# Patient Record
Sex: FEMALE | Race: WHITE | URBAN - METROPOLITAN AREA
[De-identification: names, ages, dates, MRNs, and addresses within clinical notes are randomized per-mention and may not be internally consistent; named-entity substitution may affect disease eponyms.]

---

## 2017-10-10 ENCOUNTER — OFFICE VISIT (OUTPATIENT)
Dept: ENDOCRINOLOGY | Age: 52
End: 2017-10-10

## 2017-10-10 VITALS
TEMPERATURE: 97.3 F | DIASTOLIC BLOOD PRESSURE: 73 MMHG | HEART RATE: 68 BPM | OXYGEN SATURATION: 98 % | HEIGHT: 68 IN | SYSTOLIC BLOOD PRESSURE: 104 MMHG | BODY MASS INDEX: 29.49 KG/M2 | WEIGHT: 194.6 LBS | RESPIRATION RATE: 14 BRPM

## 2017-10-10 DIAGNOSIS — E03.9 HYPOTHYROIDISM, UNSPECIFIED TYPE: Primary | ICD-10-CM

## 2017-10-10 DIAGNOSIS — C73 THYROID CANCER (HCC): ICD-10-CM

## 2017-10-10 DIAGNOSIS — E89.0 POSTOPERATIVE HYPOTHYROIDISM: Primary | ICD-10-CM

## 2017-10-10 RX ORDER — CELECOXIB 200 MG/1
200 CAPSULE ORAL DAILY
COMMUNITY
Start: 2017-08-16

## 2017-10-10 RX ORDER — EPINEPHRINE 0.3 MG/.3ML
0.3 INJECTION INTRAMUSCULAR
COMMUNITY
Start: 2017-09-29

## 2017-10-10 RX ORDER — LEVOTHYROXINE SODIUM 25 UG/1
TABLET ORAL
Refills: 0 | COMMUNITY
Start: 2017-08-17 | End: 2017-10-10 | Stop reason: SDUPTHER

## 2017-10-10 RX ORDER — MINERAL OIL
180 ENEMA (ML) RECTAL DAILY
COMMUNITY
Start: 2017-08-16

## 2017-10-10 RX ORDER — GLUCOSAMINE/CHONDR SU A SOD 750-600 MG
2500 TABLET ORAL DAILY
COMMUNITY

## 2017-10-10 RX ORDER — MONTELUKAST SODIUM 10 MG/1
10 TABLET ORAL DAILY
COMMUNITY
Start: 2017-08-16

## 2017-10-10 RX ORDER — MELOXICAM 7.5 MG/1
7.5 TABLET ORAL DAILY
COMMUNITY
Start: 2017-09-12

## 2017-10-10 RX ORDER — TRAZODONE HYDROCHLORIDE 50 MG/1
50 TABLET ORAL
COMMUNITY
Start: 2017-08-16

## 2017-10-10 RX ORDER — CHOLECALCIFEROL (VITAMIN D3) 125 MCG
5000 CAPSULE ORAL DAILY
COMMUNITY
Start: 2017-08-24

## 2017-10-10 RX ORDER — FLUOXETINE HYDROCHLORIDE 20 MG/1
20 CAPSULE ORAL DAILY
COMMUNITY
Start: 2017-08-16

## 2017-10-10 RX ORDER — FLUTICASONE PROPIONATE 50 MCG
2 SPRAY, SUSPENSION (ML) NASAL AS NEEDED
COMMUNITY
Start: 2017-09-29

## 2017-10-10 RX ORDER — BISMUTH SUBSALICYLATE 262 MG
1 TABLET,CHEWABLE ORAL DAILY
COMMUNITY

## 2017-10-10 RX ORDER — LEVOTHYROXINE SODIUM 175 UG/1
175 TABLET ORAL
Refills: 0 | COMMUNITY
Start: 2017-08-18 | End: 2017-10-10 | Stop reason: SDUPTHER

## 2017-10-10 RX ORDER — PANTOPRAZOLE SODIUM 40 MG/1
40 TABLET, DELAYED RELEASE ORAL DAILY
COMMUNITY
Start: 2017-08-16

## 2017-10-10 RX ORDER — FERROUS SULFATE TAB 325 MG (65 MG ELEMENTAL FE) 325 (65 FE) MG
325 TAB ORAL
COMMUNITY
Start: 2017-08-24

## 2017-10-10 RX ORDER — GABAPENTIN 300 MG/1
900 CAPSULE ORAL 3 TIMES DAILY
COMMUNITY
Start: 2017-08-17

## 2017-10-10 RX ORDER — LEVOTHYROXINE SODIUM 200 UG/1
200 TABLET ORAL
Qty: 30 TAB | Refills: 5 | Status: SHIPPED | OUTPATIENT
Start: 2017-10-10 | End: 2018-04-02 | Stop reason: SDUPTHER

## 2017-10-10 RX ORDER — LANOLIN ALCOHOL/MO/W.PET/CERES
400 CREAM (GRAM) TOPICAL DAILY
COMMUNITY

## 2017-10-10 NOTE — MR AVS SNAPSHOT
Visit Information Date & Time Provider Department Dept. Phone Encounter #  
 10/10/2017  1:00 PM Janeth Gale MD [facility] [phone] [id number] Follow-up Instructions Return in about 2 months (around 12/10/2017). Upcoming Health Maintenance Date Due Hepatitis C Screening 1965 DTaP/Tdap/Td series (1 - Tdap) 12/18/1986 PAP AKA CERVICAL CYTOLOGY 12/18/1986 BREAST CANCER SCRN MAMMOGRAM 12/18/2015 FOBT Q 1 YEAR AGE 50-75 12/18/2015 INFLUENZA AGE 9 TO ADULT 8/1/2017 Allergies as of 10/10/2017  Review Complete On: 10/10/2017 By: Janeth Gale MD  
  
 Severity Noted Reaction Type Reactions Mold  10/10/2017    Other (comments) Tree And Shrub Pollen  10/10/2017    Other (comments) Current Immunizations  Never Reviewed No immunizations on file. Not reviewed this visit You Were Diagnosed With   
  
 Codes Comments Postoperative hypothyroidism    -  Primary ICD-10-CM: E89.0 ICD-9-CM: 413. 0 Thyroid cancer [facility])     ICD-10-CM: J05 ICD-9-CM: 593 Vitals BP Pulse Temp Resp Height(growth percentile) Weight(growth percentile) 104/73 (BP 1 Location: Right arm, BP Patient Position: Sitting) 68 97.3 °F (36.3 °C) (Oral) 14 5' 8\" (1.727 m) 194 lb 9.6 oz (88.3 kg) SpO2 BMI OB Status Smoking Status 98% 29.59 kg/m2 Hysterectomy Never Smoker Vitals History BMI and BSA Data Body Mass Index Body Surface Area  
 29.59 kg/m 2 2.06 m 2 Preferred Pharmacy Pharmacy Name Phone [facility] [address] [phone] Your Updated Medication List  
  
   
This list is accurate as of: 10/10/17  1:51 PM.  Always use your most recent med list.  
  
  
  
  
 Biotin 2,500 mcg Cap Take 2,500 mcg by mouth daily. celecoxib 200 mg capsule Commonly known as:  CELEBREX Take 200 mg by mouth daily. cholecalciferol 5,000 unit capsule Commonly known as:  VITAMIN D3 Take 5,000 Units by mouth daily. EPIPEN 2-AYUSH 0.3 mg/0.3 mL injection Generic drug:  EPINEPHrine  
0.3 mg by IntraMUSCular route once as needed. FEROSUL 325 mg (65 mg iron) tablet Generic drug:  ferrous sulfate Take 325 mg by mouth Daily (before breakfast). fexofenadine 180 mg tablet Commonly known as:  Randine Gaye Take 180 mg by mouth daily. FLUoxetine 20 mg capsule Commonly known as:  PROzac Take 20 mg by mouth daily. fluticasone 50 mcg/actuation nasal spray Commonly known as:  Carlos Manuel Layton 2 Sprays by Both Nostrils route daily. gabapentin 300 mg capsule Commonly known as:  NEURONTIN Take 900 mg by mouth three (3) times daily. magnesium oxide 400 mg tablet Commonly known as:  MAG-OX Take 400 mg by mouth daily. meloxicam 7.5 mg tablet Commonly known as:  MOBIC Take 7.5 mg by mouth daily. montelukast 10 mg tablet Commonly known as:  SINGULAIR Take 10 mg by mouth daily. multivitamin tablet Commonly known as:  ONE A DAY Take 1 Tab by mouth daily. pantoprazole 40 mg tablet Commonly known as:  PROTONIX Take 40 mg by mouth daily. * synthroid 25 mcg tablet Generic drug:  levothyroxine  
take 1 tablet by mouth Tues, Thurs and Sat in addition to 175mcg  
  
 * SYNTHROID 175 mcg tablet Generic drug:  levothyroxine Take 175 mcg by mouth Daily (before breakfast). traZODone 50 mg tablet Commonly known as:  Lata Ceballos Take 50 mg by mouth nightly. * Notice: This list has 2 medication(s) that are the same as other medications prescribed for you. Read the directions carefully, and ask your doctor or other care provider to review them with you. Follow-up Instructions Return in about 2 months (around 12/10/2017). Patient Instructions Preventing Falls: Care Instructions Your Care Instructions Getting around your home safely can be a challenge if you have injuries or health problems that make it easy for you to fall. Loose rugs and furniture in walkways are among the dangers for many older people who have problems walking or who have poor eyesight. People who have conditions such as arthritis, osteoporosis, or dementia also have to be careful not to fall. You can make your home safer with a few simple measures. Follow-up care is a key part of your treatment and safety. Be sure to make and go to all appointments, and call your doctor if you are having problems. It's also a good idea to know your test results and keep a list of the medicines you take. How can you care for yourself at home? Taking care of yourself · You may get dizzy if you do not drink enough water. To prevent dehydration, drink plenty of fluids, enough so that your urine is light yellow or clear like water. Choose water and other caffeine-free clear liquids. If you have kidney, heart, or liver disease and have to limit fluids, talk with your doctor before you increase the amount of fluids you drink. · Exercise regularly to improve your strength, muscle tone, and balance. Walk if you can. Swimming may be a good choice if you cannot walk easily. · Have your vision and hearing checked each year or any time you notice a change. If you have trouble seeing and hearing, you might not be able to avoid objects and could lose your balance. · Know the side effects of the medicines you take. Ask your doctor or pharmacist whether the medicines you take can affect your balance. Sleeping pills or sedatives can affect your balance. · Limit the amount of alcohol you drink. Alcohol can impair your balance and other senses. · Ask your doctor whether calluses or corns on your feet need to be removed. If you wear loose-fitting shoes because of calluses or corns, you can lose your balance and fall. · Talk to your doctor if you have numbness in your feet. Preventing falls at home · Remove raised doorway thresholds, throw rugs, and clutter. Repair loose carpet or raised areas in the floor. · Move furniture and electrical cords to keep them out of walking paths. · Use nonskid floor wax, and wipe up spills right away, especially on ceramic tile floors. · If you use a walker or cane, put rubber tips on it. If you use crutches, clean the bottoms of them regularly with an abrasive pad, such as steel wool. · Keep your house well lit, especially Tamika Santiago, and outside walkways. Use night-lights in areas such as hallways and bathrooms. Add extra light switches or use remote switches (such as switches that go on or off when you clap your hands) to make it easier to turn lights on if you have to get up during the night. · Install sturdy handrails on stairways. · Move items in your cabinets so that the things you use a lot are on the lower shelves (about waist level). · Keep a cordless phone and a flashlight with new batteries by your bed. If possible, put a phone in each of the main rooms of your house, or carry a cell phone in case you fall and cannot reach a phone. Or, you can wear a device around your neck or wrist. You push a button that sends a signal for help. · Wear low-heeled shoes that fit well and give your feet good support. Use footwear with nonskid soles. Check the heels and soles of your shoes for wear. Repair or replace worn heels or soles. · Do not wear socks without shoes on wood floors. · Walk on the grass when the sidewalks are slippery. If you live in an area that gets snow and ice in the winter, sprinkle salt on slippery steps and sidewalks. Preventing falls in the bath · Install grab bars and nonskid mats inside and outside your shower or tub and near the toilet and sinks. · Use shower chairs and bath benches. · Use a hand-held shower head that will allow you to sit while showering. · Get into a tub or shower by putting the weaker leg in first. Get out of a tub or shower with your strong side first. 
· Repair loose toilet seats and consider installing a raised toilet seat to make getting on and off the toilet easier. · Keep your bathroom door unlocked while you are in the shower. Where can you learn more? Go to http://josselyn-krzysztof.info/. Enter [phone] [id number] [phone] in the search box to learn more about \"Preventing Falls: Care Instructions. \" Current as of: August 4, 2016 Content Version: 11.3 © 9631-8688 [employer]. Care instructions adapted under license by [employer] (which disclaims liability or warranty for this information). If you have questions about a medical condition or this instruction, always ask your healthcare professional. [place] [address] any warranty or liability for your use of this information. Introducing [facility] & HEALTH SERVICES! Petra Vargas introduces [employer] patient portal. Now you can access parts of your medical record, email your doctor's office, and request medication refills online. 1. In your internet browser, go to [email or web address] 2. Click on the First Time User? Click Here link in the Sign In box. You will see the New Member Sign Up page. 3. Enter your [employer] Access Code exactly as it appears below. You will not need to use this code after youve completed the sign-up process. If you do not sign up before the expiration date, you must request a new code. · [employer] Access Code: [email or web address] Expires: 1/8/2018  1:51 PM 
 
4. Enter the last four digits of your Social Security Number (xxxx) and Date of Birth (mm/dd/yyyy) as indicated and click Submit. You will be taken to the next sign-up page. 5. Create a [employer] ID.  This will be your [employer] login ID and cannot be changed, so think of one that is secure and easy to remember. 6. Create a [employer] password. You can change your password at any time. 7. Enter your Password Reset Question and Answer. This can be used at a later time if you forget your password. 8. Enter your e-mail address. You will receive e-mail notification when new information is available in [address] 9. Click Sign Up. You can now view and download portions of your medical record. 10. Click the Download Summary menu link to download a portable copy of your medical information. If you have questions, please visit the Frequently Asked Questions section of the [employer] website. Remember, [employer] is NOT to be used for urgent needs. For medical emergencies, dial 911. Now available from your iPhone and Android! Please provide this summary of care documentation to your next provider. If you have any questions after today's visit, please call [phone].

## 2017-10-10 NOTE — PATIENT INSTRUCTIONS

## 2017-10-10 NOTE — PROGRESS NOTES
Avelina Harden is a 46 y.o. female here for   Chief Complaint   Patient presents with    New Patient     [facility]       1. Have you been to the ER, urgent care clinic since your last visit? Hospitalized since your last visit? -n/a    2. Have you seen or consulted any other health care providers outside of the [address] [place] [address] since your last visit?   Include any pap smears or colon screening.-n/a    Wt Readings from Last 3 Encounters:   No data found for Wt     Temp Readings from Last 3 Encounters:   No data found for Temp     BP Readings from Last 3 Encounters:   No data found for BP     Pulse Readings from Last 3 Encounters:   No data found for Pulse

## 2017-10-18 LAB
T4 FREE SERPL-MCNC: 1.64 NG/DL (ref 0.82–1.77)
THYROGLOB AB SERPL-ACNC: <1 IU/ML (ref 0–0.9)
THYROGLOB SERPL-MCNC: <2 NG/ML
TSH SERPL DL<=0.005 MIU/L-ACNC: 0.63 UIU/ML (ref 0.45–4.5)

## 2017-12-12 ENCOUNTER — OFFICE VISIT (OUTPATIENT)
Dept: ENDOCRINOLOGY | Age: 52
End: 2017-12-12

## 2017-12-12 VITALS
SYSTOLIC BLOOD PRESSURE: 105 MMHG | TEMPERATURE: 97.9 F | RESPIRATION RATE: 16 BRPM | BODY MASS INDEX: 29.07 KG/M2 | HEART RATE: 75 BPM | HEIGHT: 68 IN | OXYGEN SATURATION: 98 % | WEIGHT: 191.8 LBS | DIASTOLIC BLOOD PRESSURE: 59 MMHG

## 2017-12-12 DIAGNOSIS — C73 THYROID CANCER (HCC): Primary | ICD-10-CM

## 2017-12-12 DIAGNOSIS — E89.0 POSTOPERATIVE HYPOTHYROIDISM: ICD-10-CM

## 2017-12-12 NOTE — MR AVS SNAPSHOT
Visit Information Date & Time Provider Department Dept. Phone Encounter #  
 12/12/2017 10:15 AM Mariam Myles MD [facility] Endocr[facility] [phone] [id number] Follow-up Instructions Return in about 6 months (around 6/12/2018). Upcoming Health Maintenance Date Due Hepatitis C Screening 1965 Pneumococcal 19-64 Highest Risk (1 of 3 - PCV13) 12/18/1984 DTaP/Tdap/Td series (1 - Tdap) 12/18/1986 PAP AKA CERVICAL CYTOLOGY 12/18/1986 BREAST CANCER SCRN MAMMOGRAM 12/18/2015 FOBT Q 1 YEAR AGE 50-75 12/18/2015 Influenza Age 5 to Adult 8/1/2017 Allergies as of 12/12/2017  Review Complete On: 12/12/2017 By: Mariam Myles MD  
  
 Severity Noted Reaction Type Reactions Mold  10/10/2017    Other (comments) Tree And Shrub Pollen  10/10/2017    Other (comments) Current Immunizations  Never Reviewed No immunizations on file. Not reviewed this visit You Were Diagnosed With   
  
 Codes Comments [facility]    -  Primary ICD-10-CM: R94 ICD-9-CM: 962 Postoperative hypothyroidism     ICD-10-CM: E89.0 ICD-9-CM: 244.0 Vitals BP Pulse Temp Resp Height(growth percentile) Weight(growth percentile) 105/59 (BP 1 Location: Left arm, BP Patient Position: Sitting) 75 97.9 °F (36.6 °C) (Oral) 16 5' 8\" (1.727 m) 191 lb 12.8 oz (87 kg) SpO2 BMI OB Status Smoking Status 98% 29.16 kg/m2 Hysterectomy Never Smoker Vitals History BMI and BSA Data Body Mass Index Body Surface Area  
 29.16 kg/m 2 2.04 m 2 Preferred Pharmacy Pharmacy Name Phone [address] [address] Your Updated Medication List  
  
   
This list is accurate as of: 12/12/17 10:59 AM.  Always use your most recent med list.  
  
  
  
  
 Biotin 2,500 mcg Cap Take 2,500 mcg by mouth daily. celecoxib 200 mg capsule Commonly known as:  CELEBREX Take 200 mg by mouth daily. cholecalciferol 5,000 unit capsule Commonly known as:  VITAMIN D3 Take 5,000 Units by mouth daily. EPIPEN 2-AYUSH 0.3 mg/0.3 mL injection Generic drug:  EPINEPHrine  
0.3 mg by IntraMUSCular route once as needed. FEROSUL 325 mg (65 mg iron) tablet Generic drug:  ferrous sulfate Take 325 mg by mouth Daily (before breakfast). fexofenadine 180 mg tablet Commonly known as:  Gloriajean Bill Take 180 mg by mouth daily. FLUoxetine 20 mg capsule Commonly known as:  PROzac Take 20 mg by mouth daily. fluticasone 50 mcg/actuation nasal spray Commonly known as:  Miriam Gut 2 Sprays by Both Nostrils route daily. gabapentin 300 mg capsule Commonly known as:  NEURONTIN Take 900 mg by mouth three (3) times daily. levothyroxine 200 mcg tablet Commonly known as:  SYNTHROID Take 1 Tab by mouth Daily (before breakfast). Brand Medically Necessary  
  
 magnesium oxide 400 mg tablet Commonly known as:  MAG-OX Take 400 mg by mouth daily. meloxicam 7.5 mg tablet Commonly known as:  MOBIC Take 7.5 mg by mouth daily. montelukast 10 mg tablet Commonly known as:  SINGULAIR Take 10 mg by mouth daily. multivitamin tablet Commonly known as:  ONE A DAY Take 1 Tab by mouth daily. pantoprazole 40 mg tablet Commonly known as:  PROTONIX Take 40 mg by mouth daily. traZODone 50 mg tablet Commonly known as:  Lemmie Yoly Take 50 mg by mouth nightly. Follow-up Instructions Return in about 6 months (around 6/12/2018). To-Do List   
 12/13/2017 Imaging:  US THYROID/PARATHYROID/SOFT TISS Introducing [facility] & HEALTH SERVICES! [facility] introduces [employer] patient portal. Now you can access parts of your medical record, email your doctor's office, and request medication refills online. 1. In your internet browser, go to [email or web address] 2. Click on the First Time User? Click Here link in the Sign In box. You will see the New Member Sign Up page. 3. Enter your [employer] Access Code exactly as it appears below. You will not need to use this code after youve completed the sign-up process. If you do not sign up before the expiration date, you must request a new code. · [employer] Access Code: [email or web address] Expires: 1/8/2018 12:51 PM 
 
4. Enter the last four digits of your Social Security Number (xxxx) and Date of Birth (mm/dd/yyyy) as indicated and click Submit. You will be taken to the next sign-up page. 5. Create a [employer] ID. This will be your [employer] login ID and cannot be changed, so think of one that is secure and easy to remember. 6. Create a [employer] password. You can change your password at any time. 7. Enter your Password Reset Question and Answer. This can be used at a later time if you forget your password. 8. Enter your e-mail address. You will receive e-mail notification when new information is available in [address] 9. Click Sign Up. You can now view and download portions of your medical record. 10. Click the Download Summary menu link to download a portable copy of your medical information. If you have questions, please visit the Frequently Asked Questions section of the [employer] website. Remember, [employer] is NOT to be used for urgent needs. For medical emergencies, dial 911. Now available from your iPhone and Android! Please provide this summary of care documentation to your next provider. If you have any questions after today's visit, please call [phone].

## 2017-12-12 NOTE — PROGRESS NOTES
[facility]               Cathryn Myrick MD        [address] 78 [phone] Fax [phone]             Patient Information  Date:12/12/2017  Name : Marybeth Albarado 51 y.o.     YOB: 1965         Referred by:  Hunter Brar, [facility]    Chief Complaint   Patient presents with    Thyroid Problem         History of present illness    Marybeth Albarado is a 46 y.o. female  here for fu of thyroid. She was diagnosed with papillary thyroid cancer 1.1 cm, status post total thyroidectomy in 2015, followed by radioactive iodine ablation 50 mCi, had whole body scan which was negative. CT neck in 2016 showed abnormal lymph nodes, and a whole body Thyrogen stimulated scan which was normal.  Ultrasound in 2017 showed lymphadenopathy but not suspicious for thyroid cancer. She had another Thyrogen stimulated whole-body scan which was normal.    She has been taking the levothyroxine consistently.,  Has lost a few pounds. thyroglobulin was less than 2, nonstimulated thyroglobulin. Wt Readings from Last 3 Encounters:   12/12/17 191 lb 12.8 oz (87 kg)   10/10/17 194 lb 9.6 oz (88.3 kg)       Past Medical History:   Diagnosis Date    Anxiety     Bulging lumbar disc     GERD (gastroesophageal reflux disease)     Menopausal state     Thyroid cancer (HCC)        Current Outpatient Prescriptions   Medication Sig    celecoxib (CELEBREX) 200 mg capsule Take 200 mg by mouth daily.  cholecalciferol (VITAMIN D3) 5,000 unit capsule Take 5,000 Units by mouth daily.  EPIPEN 2-AYUSH 0.3 mg/0.3 mL injection 0.3 mg by IntraMUSCular route once as needed.  FLUoxetine (PROZAC) 20 mg capsule Take 20 mg by mouth daily.  fluticasone (FLONASE) 50 mcg/actuation nasal spray 2 Sprays by Both Nostrils route daily.  gabapentin (NEURONTIN) 300 mg capsule Take 900 mg by mouth three (3) times daily.     montelukast (SINGULAIR) 10 mg tablet Take 10 mg by mouth daily.  pantoprazole (PROTONIX) 40 mg tablet Take 40 mg by mouth daily.  traZODone (DESYREL) 50 mg tablet Take 50 mg by mouth nightly.  fexofenadine (ALLEGRA) 180 mg tablet Take 180 mg by mouth daily.  FEROSUL 325 mg (65 mg iron) tablet Take 325 mg by mouth Daily (before breakfast).  multivitamin (ONE A DAY) tablet Take 1 Tab by mouth daily.  Biotin 2,500 mcg cap Take 2,500 mcg by mouth daily.  magnesium oxide (MAG-OX) 400 mg tablet Take 400 mg by mouth daily.  levothyroxine (SYNTHROID) 200 mcg tablet Take 1 Tab by mouth Daily (before breakfast). Brand Medically Necessary    meloxicam (MOBIC) 7.5 mg tablet Take 7.5 mg by mouth daily. No current facility-administered medications for this visit. Allergies   Allergen Reactions    Mold Other (comments)    Tree And Shrub Pollen Other (comments)         Review of Systems:  All 10 systems  reviewed and are negative other than mentioned in HPI    Physical Examination:  Blood pressure 105/59, pulse 75, temperature 97.9 °F (36.6 °C), temperature source Oral, resp. rate 16, height 5' 8\" (1.727 m), weight 191 lb 12.8 oz (87 kg), SpO2 98 %. - General: pleasant, no distress, good eye contact  - HEENT: no exopthalmos, no periorbital edema, no scleral/conjunctival injection, EOMI, no lid lag or stare  - Neck: supple, no mass  - Cardiovascular: regular, normal rate, normal S1 and S2,  - Respiratory: clear to auscultation bilaterally  - Gastrointestinal: soft, nontender, nondistended, BS +  - Musculoskeletal: no proximal muscle weakness in upper or lower extremities  - Integumentary: no tremors, no edema,  - Neurological:alert and oriented   - Psychiatric: normal mood and affect    Data Reviewed:     [] Reviewed labs      Assessment/Plan:     1. Thyroid cancer ([facility]    2.  Postoperative hypothyroidism      Papillary thyroid cancer 1.1 cm, no lymph node involvement, status post total thyroidectomy , 2015, followed by radioactive iodine ablation. Low risk cancer. She is two years out of the diagnosis of cancer, no need to suppress TSH. Check TSH along with thyroglobulin, and thyroglobulin antibody. She needs ultrasound. The last whole-body scan was normal.  Ultrasound of the thyroid bed with attention to the cervical lymph nodes. Post ablation  hypothyroidism   Synthroid - need brand name 200 mcg daily , 1/2 tab on Sunday       Discussed about the various factors which can affect TSH including medications,weight change,illness, compliance and instructions to take LT4 by itself to keep the levels stable. Discussed various causes of weight gain ,association with other autoimmune conditions,stressed the importance of life style changes.       Follow-up Disposition: Not on File        Patient verbalized understanding

## 2017-12-12 NOTE — PROGRESS NOTES
Kailash Ledesma is a 46 y.o. female here for   Chief Complaint   Patient presents with    Thyroid Problem       1. Have you been to the ER, urgent care clinic since your last visit? Hospitalized since your last visit? -no    2. Have you seen or consulted any other health care providers outside of the [address] [place] [address] since your last visit?   Include any pap smears or colon screening.-no    Wt Readings from Last 3 Encounters:   10/10/17 194 lb 9.6 oz (88.3 kg)     Temp Readings from Last 3 Encounters:   10/10/17 97.3 °F (36.3 °C) (Oral)     BP Readings from Last 3 Encounters:   10/10/17 104/73     Pulse Readings from Last 3 Encounters:   10/10/17 68

## 2018-02-01 ENCOUNTER — TELEPHONE (OUTPATIENT)
Dept: ENDOCRINOLOGY | Age: 53
End: 2018-02-01

## 2018-02-01 NOTE — TELEPHONE ENCOUNTER
Informed pt that Dr Giovana Caballero has reviewed thyroid US and states there are no abnormal lymph nodes or suspicious lymph nodes, they are stable and there is no concern for cancer. Pt verbalized understanding.

## 2018-04-02 DIAGNOSIS — E03.9 HYPOTHYROIDISM, UNSPECIFIED TYPE: ICD-10-CM

## 2018-04-02 RX ORDER — LEVOTHYROXINE SODIUM 200 UG/1
200 TABLET ORAL
Qty: 30 TAB | Refills: 11 | Status: SHIPPED | OUTPATIENT
Start: 2018-04-02 | End: 2018-06-27 | Stop reason: SDUPTHER

## 2018-04-02 NOTE — TELEPHONE ENCOUNTER
----- Message from Yanet Solares sent at 4/2/2018 11:02 AM EDT -----  Regarding: Dr. Corby Gomez / refill   Patient is requesting a refill Rx\"Synthroid 200mg\"  Patient has enough medication for approx 1 week.  Would like to have \"appeal  form completed to have [place] cover cost of medication\"  Send to [employer] [phone] Best contact [phone]

## 2018-06-27 ENCOUNTER — OFFICE VISIT (OUTPATIENT)
Dept: ENDOCRINOLOGY | Age: 53
End: 2018-06-27

## 2018-06-27 VITALS
SYSTOLIC BLOOD PRESSURE: 110 MMHG | DIASTOLIC BLOOD PRESSURE: 75 MMHG | OXYGEN SATURATION: 99 % | BODY MASS INDEX: 27.22 KG/M2 | RESPIRATION RATE: 16 BRPM | HEIGHT: 68 IN | HEART RATE: 75 BPM | TEMPERATURE: 97.7 F | WEIGHT: 179.6 LBS

## 2018-06-27 DIAGNOSIS — C73 THYROID CANCER (HCC): Primary | ICD-10-CM

## 2018-06-27 DIAGNOSIS — E89.0 POSTOPERATIVE HYPOTHYROIDISM: ICD-10-CM

## 2018-06-27 DIAGNOSIS — E03.9 HYPOTHYROIDISM, UNSPECIFIED TYPE: ICD-10-CM

## 2018-06-27 RX ORDER — LEVOTHYROXINE SODIUM 200 UG/1
200 TABLET ORAL
Qty: 90 TAB | Refills: 3 | Status: SHIPPED | OUTPATIENT
Start: 2018-06-27 | End: 2019-04-12 | Stop reason: SDUPTHER

## 2018-06-27 NOTE — MR AVS SNAPSHOT
[address] [facility] [address] [facility] [address] 
[phone] Patient: Chris Barber MRN: [id number] :1965 Visit Information Date & Time Provider Department Dept. Phone Encounter #  
 2018  8:30 AM Aileen Marie MD [facility] Diabetes & Endocrinology [phone] [id number] Follow-up Instructions Return in about 7 months (around 2019). Upcoming Health Maintenance Date Due Hepatitis C Screening 1965 Pneumococcal 19-64 Highest Risk (1 of 3 - PCV13) 1984 DTaP/Tdap/Td series (1 - Tdap) 1986 PAP AKA CERVICAL CYTOLOGY 1986 BREAST CANCER SCRN MAMMOGRAM 2015 FOBT Q 1 YEAR AGE 50-75 2015 Influenza Age 5 to Adult 2018 Allergies as of 2018  Review Complete On: 2018 By: Aileen Marie MD  
  
 Severity Noted Reaction Type Reactions Mold  10/10/2017    Other (comments) Tree And Shrub Pollen  10/10/2017    Other (comments) Current Immunizations  Never Reviewed No immunizations on file. Not reviewed this visit You Were Diagnosed With   
  
 Codes Comments [facility]    -  Primary ICD-10-CM: J43 ICD-9-CM: 179 Postoperative hypothyroidism     ICD-10-CM: E89.0 ICD-9-CM: 244.0 Vitals BP Pulse Temp Resp Height(growth percentile) Weight(growth percentile) 110/75 (BP 1 Location: Left arm, BP Patient Position: Sitting) 75 97.7 °F (36.5 °C) (Oral) 16 5' 8\" (1.727 m) 179 lb 9.6 oz (81.5 kg) SpO2 BMI OB Status Smoking Status 99% 27.31 kg/m2 Hysterectomy Never Smoker Vitals History BMI and BSA Data Body Mass Index Body Surface Area  
 27.31 kg/m 2 1.98 m 2 Preferred Pharmacy Pharmacy Name Phone [address] [phone] Your Updated Medication List  
  
   
 This list is accurate as of 6/27/18  8:53 AM.  Always use your most recent med list.  
  
  
  
  
 Biotin 2,500 mcg Cap Take 2,500 mcg by mouth daily. celecoxib 200 mg capsule Commonly known as:  CELEBREX Take 200 mg by mouth daily. cholecalciferol 5,000 unit capsule Commonly known as:  VITAMIN D3 Take 5,000 Units by mouth daily. EPIPEN 2-AYUSH 0.3 mg/0.3 mL injection Generic drug:  EPINEPHrine  
0.3 mg by IntraMUSCular route once as needed. FEROSUL 325 mg (65 mg iron) tablet Generic drug:  ferrous sulfate Take 325 mg by mouth Daily (before breakfast). fexofenadine 180 mg tablet Commonly known as:  Luciana Fickle Take 180 mg by mouth daily. FLUoxetine 20 mg capsule Commonly known as:  PROzac Take 20 mg by mouth daily. fluticasone 50 mcg/actuation nasal spray Commonly known as:  Diana Hess 2 Sprays by Both Nostrils route daily. gabapentin 300 mg capsule Commonly known as:  NEURONTIN Take 900 mg by mouth three (3) times daily. magnesium oxide 400 mg tablet Commonly known as:  MAG-OX Take 400 mg by mouth daily. meloxicam 7.5 mg tablet Commonly known as:  MOBIC Take 7.5 mg by mouth daily. montelukast 10 mg tablet Commonly known as:  SINGULAIR Take 10 mg by mouth daily. multivitamin tablet Commonly known as:  ONE A DAY Take 1 Tab by mouth daily. pantoprazole 40 mg tablet Commonly known as:  PROTONIX Take 40 mg by mouth daily. SYNTHROID 200 mcg tablet Generic drug:  levothyroxine Take 1 Tab by mouth Daily (before breakfast). Brand Medically Necessary  
  
 traZODone 50 mg tablet Commonly known as:  Alan Ort Take 50 mg by mouth nightly. Follow-up Instructions Return in about 7 months (around 1/27/2019). Introducing [facility]!    
 Jhon Moore introduces [employer] patient portal. Now you can access parts of your medical record, email your doctor's office, and request medication refills online. 1. In your internet browser, go to [email or web address] 2. Click on the First Time User? Click Here link in the Sign In box. You will see the New Member Sign Up page. 3. Enter your [employer] Access Code exactly as it appears below. You will not need to use this code after youve completed the sign-up process. If you do not sign up before the expiration date, you must request a new code. · [employer] Access Code: [email or web address] Expires: 9/25/2018  8:23 AM 
 
4. Enter the last four digits of your Social Security Number (xxxx) and Date of Birth (mm/dd/yyyy) as indicated and click Submit. You will be taken to the next sign-up page. 5. Create a [employer]Al[employer] ID. This will be your [employer] login ID and cannot be changed, so think of one that is secure and easy to remember. 6. Create a [employer] password. You can change your password at any time. 7. Enter your Password Reset Question and Answer. This can be used at a later time if you forget your password. 8. Enter your e-mail address. You will receive e-mail notification when new information is available in [address] 9. Click Sign Up. You can now view and download portions of your medical record. 10. Click the Download Summary menu link to download a portable copy of your medical information. If you have questions, please visit the Frequently Asked Questions section of the [employer] website. Remember, [employer] is NOT to be used for urgent needs. For medical emergencies, dial 911. Now available from your iPhone and Android! Please provide this summary of care documentation to your next provider. If you have any questions after today's visit, please call [phone].

## 2018-06-27 NOTE — PROGRESS NOTES
Jane Ward is a 46 y.o. female here for   Chief Complaint   Patient presents with    Thyroid Problem       1. Have you been to the ER, urgent care clinic since your last visit? Hospitalized since your last visit? - no    2. Have you seen or consulted any other health care providers outside of the [address] [place] [address] since your last visit?   Include any pap smears or colon screening.- PCP, PT, allergist

## 2018-06-27 NOTE — PROGRESS NOTES
[facility]               Vaishali Urban MD        [address] 78 [phone] Fax [phone]             Patient Information  Date:6/27/2018  Name : Chago Albarado 52 y.o.     YOB: 1965         Referred by:  Mercedes Garcia, [address]    Chief Complaint   Patient presents with    Thyroid Problem         History of present illness    Chago Albarado is a 46 y.o. female  here for fu of thyroid. She was diagnosed with papillary thyroid cancer 1.1 cm, status post total thyroidectomy in 2015, followed by radioactive iodine ablation 50 mCi, had whole body scan which was negative. CT neck in 2016 showed abnormal lymph nodes, and a whole body Thyrogen stimulated scan which was normal.  Ultrasound in 2017 showed lymphadenopathy but not suspicious for thyroid cancer. She had another Thyrogen stimulated whole-body scan which was normal.    She has been taking the levothyroxine consistently.,  Except on Sunday when she is taking half a tablet as per recommendation. She has not noticed any change in the size of the neck. Lost some weight   thyroglobulin was less than 2, nonstimulated thyroglobulin. Wt Readings from Last 3 Encounters:   06/27/18 179 lb 9.6 oz (81.5 kg)   12/12/17 191 lb 12.8 oz (87 kg)   10/10/17 194 lb 9.6 oz (88.3 kg)       Past Medical History:   Diagnosis Date    Anxiety     Bulging lumbar disc     GERD (gastroesophageal reflux disease)     Menopausal state     Thyroid cancer (HCC)        Current Outpatient Prescriptions   Medication Sig    FLUoxetine (PROZAC) 20 mg capsule Take 20 mg by mouth daily.  fluticasone (FLONASE) 50 mcg/actuation nasal spray 2 Sprays by Both Nostrils route daily.  gabapentin (NEURONTIN) 300 mg capsule Take 900 mg by mouth three (3) times daily.  montelukast (SINGULAIR) 10 mg tablet Take 10 mg by mouth daily.  pantoprazole (PROTONIX) 40 mg tablet Take 40 mg by mouth daily.  traZODone (DESYREL) 50 mg tablet Take 50 mg by mouth nightly.  meloxicam (MOBIC) 7.5 mg tablet Take 7.5 mg by mouth daily.  fexofenadine (ALLEGRA) 180 mg tablet Take 180 mg by mouth daily.  multivitamin (ONE A DAY) tablet Take 1 Tab by mouth daily.  magnesium oxide (MAG-OX) 400 mg tablet Take 400 mg by mouth daily.  SYNTHROID 200 mcg tablet Take 1 Tab by mouth Daily (before breakfast). Brand Medically Necessary    celecoxib (CELEBREX) 200 mg capsule Take 200 mg by mouth daily.  cholecalciferol (VITAMIN D3) 5,000 unit capsule Take 5,000 Units by mouth daily.  EPIPEN 2-AYUSH 0.3 mg/0.3 mL injection 0.3 mg by IntraMUSCular route once as needed.  FEROSUL 325 mg (65 mg iron) tablet Take 325 mg by mouth Daily (before breakfast).  Biotin 2,500 mcg cap Take 2,500 mcg by mouth daily. No current facility-administered medications for this visit. Allergies   Allergen Reactions    Mold Other (comments)    Tree And Shrub Pollen Other (comments)         Review of Systems:  All 10 systems  reviewed and are negative other than mentioned in HPI    Physical Examination:  Blood pressure 110/75, pulse 75, temperature 97.7 °F (36.5 °C), temperature source Oral, resp. rate 16, height 5' 8\" (1.727 m), weight 179 lb 9.6 oz (81.5 kg), SpO2 99 %. - General: pleasant, no distress, good eye contact  - HEENT: no exopthalmos, no periorbital edema, no scleral/conjunctival injection, EOMI, no lid lag or stare  - Neck: supple, no mass  - Cardiovascular: regular, normal rate, normal S1 and S2,  - Respiratory: clear to auscultation bilaterally  - Gastrointestinal: soft, nontender, nondistended, BS +  - Musculoskeletal: no proximal muscle weakness in upper or lower extremities  - Integumentary: no tremors, no edema,  - Neurological:alert and oriented   - Psychiatric: normal mood and affect    Data Reviewed:     [] Reviewed labs      Assessment/Plan:     1. Thyroid cancer ([address]    2.  Postoperative hypothyroidism Papillary thyroid cancer 1.1 cm, no lymph node involvement, status post total thyroidectomy , 2015, followed by radioactive iodine ablation. Low risk cancer. She is two years out of the diagnosis of cancer, no need to suppress TSH. Thyroglobulin, thyroglobulin antibody negative, nonstimulated  The last whole-body scan was normal.  Ultrasound of the thyroid bed -no abnormal or suspicious lymph nodes noted, they has been stable, all the lymph nodes have fatty hilum    Post ablation  hypothyroidism   Synthroid - need brand name 200 mcg daily , none on  Sunday       Discussed about the various factors which can affect TSH including medications,weight change,illness, compliance and instructions to take LT4 by itself to keep the levels stable. Discussed various causes of weight gain ,association with other autoimmune conditions,stressed the importance of life style changes.       Follow-up Disposition: Not on File        Patient verbalized understanding

## 2019-04-11 NOTE — PROGRESS NOTES
Lety Albarado is a 48 y.o. female here for Chief Complaint Patient presents with  Thyroid Problem 1. Have you been to the ER, urgent care clinic since your last visit? Hospitalized since your last visit? -no 
 
2. Have you seen or consulted any other health care providers outside of the [address] [place] [address] since your last visit? Include any pap smears or colon screening. -PCP

## 2019-04-12 ENCOUNTER — OFFICE VISIT (OUTPATIENT)
Dept: ENDOCRINOLOGY | Age: 54
End: 2019-04-12

## 2019-04-12 VITALS
DIASTOLIC BLOOD PRESSURE: 72 MMHG | OXYGEN SATURATION: 97 % | BODY MASS INDEX: 30.62 KG/M2 | WEIGHT: 202 LBS | TEMPERATURE: 98.8 F | HEART RATE: 78 BPM | SYSTOLIC BLOOD PRESSURE: 116 MMHG | HEIGHT: 68 IN | RESPIRATION RATE: 16 BRPM

## 2019-04-12 DIAGNOSIS — C73 THYROID CANCER (HCC): Primary | ICD-10-CM

## 2019-04-12 DIAGNOSIS — E89.0 POSTOPERATIVE HYPOTHYROIDISM: ICD-10-CM

## 2019-04-12 DIAGNOSIS — E89.0 POSTOPERATIVE HYPOTHYROIDISM: Primary | ICD-10-CM

## 2019-04-12 PROBLEM — M53.3 DISORDER OF SACROILIAC JOINT: Status: ACTIVE | Noted: 2019-04-11

## 2019-04-12 PROBLEM — M47.817 LUMBOSACRAL SPONDYLOSIS WITHOUT MYELOPATHY: Status: ACTIVE | Noted: 2019-04-11

## 2019-04-12 RX ORDER — LEVOTHYROXINE SODIUM 150 MCG
150 TABLET ORAL
Qty: 90 TAB | Refills: 3 | Status: SHIPPED | OUTPATIENT
Start: 2019-04-12

## 2019-04-12 NOTE — PROGRESS NOTES
[facility] DIABETES AND ENDOCRINOLOGY Chandrakant Foster MD 
 
    [address] [phone] Fax [phone] Patient Information Date:4/12/2019 Name : Marcos Albarado 53 y.o.    
YOB: 1965 Referred by:  Teri Rosales, [address] [facility] [address] Chief Complaint Patient presents with  Thyroid Problem History of present illness Marcos Albarado is a 48 y.o. female  here for fu of thyroid. She was diagnosed with papillary thyroid cancer 1.1 cm, status post total thyroidectomy in 2015, followed by radioactive iodine ablation 50 mCi, had whole body scan which was negative. CT neck in 2016 showed abnormal lymph nodes, and a whole body Thyrogen stimulated scan which was normal.  Ultrasound in 2017 showed lymphadenopathy but not suspicious for thyroid cancer. She had another Thyrogen stimulated whole-body scan which was normal which was done by previous endocrinologist. 
 
She has been taking the levothyroxine consistently. , 
 
 
 thyroglobulin was less than 2, nonstimulated thyroglobulin. Wt Readings from Last 3 Encounters:  
04/12/19 202 lb (91.6 kg) 06/27/18 179 lb 9.6 oz (81.5 kg) 12/12/17 191 lb 12.8 oz (87 kg) Past Medical History:  
Diagnosis Date  Anxiety  Bulging lumbar disc  GERD (gastroesophageal reflux disease)  Menopausal state  Thyroid cancer ([facility] Current Outpatient Medications Medication Sig  
 SYNTHROID 200 mcg tablet Take 1 Tab by mouth Daily (before breakfast). Brand Medically Necessary  celecoxib (CELEBREX) 200 mg capsule Take 200 mg by mouth daily.  EPIPEN 2-AYUSH 0.3 mg/0.3 mL injection 0.3 mg by IntraMUSCular route once as needed.  FLUoxetine (PROZAC) 20 mg capsule Take 20 mg by mouth daily.  fluticasone (FLONASE) 50 mcg/actuation nasal spray 2 Sprays by Both Nostrils route as needed.  gabapentin (NEURONTIN) 300 mg capsule Take 900 mg by mouth three (3) times daily.  montelukast (SINGULAIR) 10 mg tablet Take 10 mg by mouth daily.  pantoprazole (PROTONIX) 40 mg tablet Take 40 mg by mouth daily.  traZODone (DESYREL) 50 mg tablet Take 50 mg by mouth nightly.  fexofenadine (ALLEGRA) 180 mg tablet Take 180 mg by mouth daily.  multivitamin (ONE A DAY) tablet Take 1 Tab by mouth daily.  Biotin 2,500 mcg cap Take 2,500 mcg by mouth daily.  cholecalciferol (VITAMIN D3) 5,000 unit capsule Take 5,000 Units by mouth daily.  meloxicam (MOBIC) 7.5 mg tablet Take 7.5 mg by mouth daily.  FEROSUL 325 mg (65 mg iron) tablet Take 325 mg by mouth Daily (before breakfast).  magnesium oxide (MAG-OX) 400 mg tablet Take 400 mg by mouth daily. No current facility-administered medications for this visit. Allergies Allergen Reactions  Mold Other (comments)  Tree And Shrub Pollen Other (comments) Review of Systems:  All 10 systems  reviewed and are negative other than mentioned in HPI Physical Examination: 
Blood pressure 116/72, pulse 78, temperature 98.8 °F (37.1 °C), temperature source Oral, resp. rate 16, height 5' 8\" (1.727 m), weight 202 lb (91.6 kg), SpO2 97 %. - General: pleasant, no distress, good eye contact 
- HEENT: no exopthalmos, no periorbital edema, no scleral/conjunctival injection, EOMI, no lid lag or stare 
- Neck: supple, no lymphadenopathy - Cardiovascular: regular, normal rate, normal S1 and S2, 
- Respiratory: clear to auscultation bilaterally - Gastrointestinal: soft, nontender, nondistended, BS + 
- Musculoskeletal: no proximal muscle weakness in upper or lower extremities - Integumentary: no tremors, no edema, 
- Neurological:alert and oriented - Psychiatric: normal mood and affect Data Reviewed:  
 
[] Reviewed labs Assessment/Plan: 1. Thyroid cancer ([facility] 2. Postoperative hypothyroidism Papillary thyroid cancer 1.1 cm, no lymph node involvement, status post total thyroidectomy , 2015, followed by radioactive iodine ablation. Low risk cancer. She is 4 years out of the diagnosis of cancer, no need to suppress TSH. Thyroglobulin, thyroglobulin antibody negative, nonstimulated The last whole-body scan was normal.  Ultrasound of the thyroid bed  Jan 2018 -no abnormal or suspicious lymph nodes noted, they has been stable, all the lymph nodes have fatty hilum Post ablation  hypothyroidism Synthroid - need brand name 150 mcg daily , 
 
 
 
 
 
 
 
Patient verbalized understanding

## 2019-04-12 NOTE — LETTER
4/13/19 Patient: Ty Braswell May YOB: 1965 Date of Visit: 4/12/2019 Adama Nova NP 
[address] [address] VIA Facsimile: [phone] Dear Adama Nova NP, Thank you for referring Ms. Raymond Cortes May to [address] for evaluation. My notes for this consultation are attached. If you have questions, please do not hesitate to call me. I look forward to following your patient along with you. Sincerely, Jennie Xavier MD

## 2019-10-25 ENCOUNTER — PATIENT MESSAGE (OUTPATIENT)
Dept: ENDOCRINOLOGY | Age: 54
End: 2019-10-25

## 2022-03-18 PROBLEM — C73 THYROID CANCER (HCC): Status: ACTIVE | Noted: 2017-10-10

## 2022-03-19 PROBLEM — M53.3 DISORDER OF SACROILIAC JOINT: Status: ACTIVE | Noted: 2019-04-11

## 2022-03-19 PROBLEM — M47.817 LUMBOSACRAL SPONDYLOSIS WITHOUT MYELOPATHY: Status: ACTIVE | Noted: 2019-04-11

## 2022-03-20 PROBLEM — E89.0 POSTOPERATIVE HYPOTHYROIDISM: Status: ACTIVE | Noted: 2017-10-10

## 2023-03-29 ENCOUNTER — APPOINTMENT (RX ONLY)
Dept: URBAN - METROPOLITAN AREA CLINIC 89 | Facility: CLINIC | Age: 58
Setting detail: DERMATOLOGY
End: 2023-03-29

## 2023-03-29 DIAGNOSIS — Z71.89 OTHER SPECIFIED COUNSELING: ICD-10-CM

## 2023-03-29 DIAGNOSIS — L82.1 OTHER SEBORRHEIC KERATOSIS: ICD-10-CM

## 2023-03-29 DIAGNOSIS — D485 NEOPLASM OF UNCERTAIN BEHAVIOR OF SKIN: ICD-10-CM

## 2023-03-29 DIAGNOSIS — D22 MELANOCYTIC NEVI: ICD-10-CM

## 2023-03-29 PROBLEM — D22.5 MELANOCYTIC NEVI OF TRUNK: Status: ACTIVE | Noted: 2023-03-29

## 2023-03-29 PROBLEM — D23.39 OTHER BENIGN NEOPLASM OF SKIN OF OTHER PARTS OF FACE: Status: ACTIVE | Noted: 2023-03-29

## 2023-03-29 PROBLEM — D48.5 NEOPLASM OF UNCERTAIN BEHAVIOR OF SKIN: Status: ACTIVE | Noted: 2023-03-29

## 2023-03-29 PROCEDURE — ? DEFER

## 2023-03-29 PROCEDURE — ? TREATMENT REGIMEN

## 2023-03-29 PROCEDURE — 99203 OFFICE O/P NEW LOW 30 MIN: CPT

## 2023-03-29 PROCEDURE — ? COUNSELING

## 2023-03-29 ASSESSMENT — LOCATION SIMPLE DESCRIPTION DERM
LOCATION SIMPLE: UPPER BACK
LOCATION SIMPLE: CHEST
LOCATION SIMPLE: LEFT LOWER BACK
LOCATION SIMPLE: LEFT CHEEK

## 2023-03-29 ASSESSMENT — LOCATION ZONE DERM
LOCATION ZONE: FACE
LOCATION ZONE: TRUNK

## 2023-03-29 ASSESSMENT — LOCATION DETAILED DESCRIPTION DERM
LOCATION DETAILED: RIGHT MEDIAL SUPERIOR CHEST
LOCATION DETAILED: LEFT INFERIOR LATERAL MIDBACK
LOCATION DETAILED: LEFT SUPERIOR CENTRAL MALAR CHEEK
LOCATION DETAILED: INFERIOR THORACIC SPINE

## 2023-03-29 NOTE — PROCEDURE: TREATMENT REGIMEN
Samples Given: Cloderm
Plan: Pt will use samples of Cloderm to AA by eye for two weeks and then return for reeval.  If not resolved consider biopsy
Detail Level: Detailed

## 2023-03-29 NOTE — HPI: SKIN LESION
Is This A New Presentation, Or A Follow-Up?: Moles
What Type Of Note Output Would You Prefer (Optional)?: Standard Output
How Severe Is Your Skin Lesion?: mild
Has Your Skin Lesion Been Treated?: not been treated
Which Family Member (Optional)?: Sister
Is This A New Presentation, Or A Follow-Up?: Skin Lesion

## 2023-03-29 NOTE — PROCEDURE: DEFER
Detail Level: Detailed
Size Of Lesion In Cm (Optional): 0.2
Introduction Text (Please End With A Colon): The following procedure was deferred:biopsy
X Size Of Lesion In Cm (Optional): 0

## 2023-04-14 ENCOUNTER — APPOINTMENT (RX ONLY)
Dept: URBAN - METROPOLITAN AREA CLINIC 89 | Facility: CLINIC | Age: 58
Setting detail: DERMATOLOGY
End: 2023-04-14

## 2023-04-14 DIAGNOSIS — D485 NEOPLASM OF UNCERTAIN BEHAVIOR OF SKIN: ICD-10-CM

## 2023-04-14 PROBLEM — D48.5 NEOPLASM OF UNCERTAIN BEHAVIOR OF SKIN: Status: ACTIVE | Noted: 2023-04-14

## 2023-04-14 PROCEDURE — ? TREATMENT REGIMEN

## 2023-04-14 PROCEDURE — ? COUNSELING

## 2023-04-14 PROCEDURE — ? BIOPSY BY SHAVE METHOD

## 2023-04-14 PROCEDURE — 11102 TANGNTL BX SKIN SINGLE LES: CPT

## 2023-04-14 PROCEDURE — 99212 OFFICE O/P EST SF 10 MIN: CPT | Mod: 25

## 2023-04-14 ASSESSMENT — LOCATION ZONE DERM
LOCATION ZONE: NOSE
LOCATION ZONE: FACE

## 2023-04-14 ASSESSMENT — LOCATION SIMPLE DESCRIPTION DERM
LOCATION SIMPLE: RIGHT NOSE
LOCATION SIMPLE: LEFT CHEEK

## 2023-04-14 ASSESSMENT — LOCATION DETAILED DESCRIPTION DERM
LOCATION DETAILED: LEFT SUPERIOR CENTRAL MALAR CHEEK
LOCATION DETAILED: RIGHT NASAL SIDEWALL

## 2023-04-14 NOTE — PROCEDURE: BIOPSY BY SHAVE METHOD
Detail Level: Detailed
Depth Of Biopsy: dermis
Was A Bandage Applied: Yes
Size Of Lesion In Cm: 0.6
X Size Of Lesion In Cm: 0
Biopsy Type: H and E
Biopsy Method: Dermablade
Anesthesia Type: 1% lidocaine with epinephrine
Anesthesia Volume In Cc (Will Not Render If 0): 0.5
Hemostasis: Aluminum Chloride
Wound Care: Petrolatum
Dressing: bandage
Destruction After The Procedure: No
Type Of Destruction Used: Curettage
Curettage Text: The wound bed was treated with curettage after the biopsy was performed.
Cryotherapy Text: The wound bed was treated with cryotherapy after the biopsy was performed.
Electrodesiccation Text: The wound bed was treated with electrodesiccation after the biopsy was performed.
Electrodesiccation And Curettage Text: The wound bed was treated with electrodesiccation and curettage after the biopsy was performed.
Silver Nitrate Text: The wound bed was treated with silver nitrate after the biopsy was performed.
Lab: [id number]
Lab Facility: [id number]
Consent: Written consent was obtained and risks were reviewed including but not limited to scarring, infection, bleeding, scabbing, incomplete removal, nerve damage and allergy to anesthesia.
Post-Care Instructions: I reviewed with the patient in detail post-care instructions. Patient is to keep the biopsy site dry overnight, and then apply bacitracin twice daily until healed. Patient may apply hydrogen peroxide soaks to remove any crusting.
Notification Instructions: Patient will be notified of biopsy results. However, patient instructed to call the office if not contacted within 2 weeks.
Billing Type: Third-Party Bill
Information: Selecting Yes will display possible errors in your note based on the variables you have selected. This validation is only offered as a suggestion for you. PLEASE NOTE THAT THE VALIDATION TEXT WILL BE REMOVED WHEN YOU FINALIZE YOUR NOTE. IF YOU WANT TO FAX A PRELIMINARY NOTE YOU WILL NEED TO TOGGLE THIS TO 'NO' IF YOU DO NOT WANT IT IN YOUR FAXED NOTE.

## 2023-05-22 RX ORDER — FLUTICASONE PROPIONATE 50 MCG
2 SPRAY, SUSPENSION (ML) NASAL PRN
COMMUNITY
Start: 2017-09-29

## 2023-05-22 RX ORDER — PANTOPRAZOLE SODIUM 40 MG/1
40 TABLET, DELAYED RELEASE ORAL DAILY
COMMUNITY
Start: 2017-08-16

## 2023-05-22 RX ORDER — FERROUS SULFATE 325(65) MG
325 TABLET ORAL
COMMUNITY
Start: 2017-08-24

## 2023-05-22 RX ORDER — EPINEPHRINE 0.3 MG/.3ML
0.3 INJECTION SUBCUTANEOUS
COMMUNITY
Start: 2017-09-29

## 2023-05-22 RX ORDER — FEXOFENADINE HCL 180 MG/1
180 TABLET ORAL DAILY
COMMUNITY
Start: 2017-08-16

## 2023-05-22 RX ORDER — MELOXICAM 7.5 MG/1
7.5 TABLET ORAL DAILY
COMMUNITY
Start: 2017-09-12

## 2023-05-22 RX ORDER — MAGNESIUM OXIDE 400 MG/1
400 TABLET ORAL DAILY
COMMUNITY

## 2023-05-22 RX ORDER — GABAPENTIN 300 MG/1
900 CAPSULE ORAL 3 TIMES DAILY
COMMUNITY
Start: 2017-08-17

## 2023-05-22 RX ORDER — FLUOXETINE HYDROCHLORIDE 20 MG/1
20 CAPSULE ORAL DAILY
COMMUNITY
Start: 2017-08-16

## 2023-05-22 RX ORDER — MONTELUKAST SODIUM 10 MG/1
10 TABLET ORAL DAILY
COMMUNITY
Start: 2017-08-16

## 2023-05-22 RX ORDER — TRAZODONE HYDROCHLORIDE 50 MG/1
50 TABLET ORAL NIGHTLY
COMMUNITY
Start: 2017-08-16

## 2023-05-22 RX ORDER — LEVOTHYROXINE SODIUM 0.15 MG/1
150 TABLET ORAL
COMMUNITY
Start: 2019-04-12

## 2023-05-22 RX ORDER — CELECOXIB 200 MG/1
200 CAPSULE ORAL DAILY
COMMUNITY
Start: 2017-08-16

## 2025-04-23 ENCOUNTER — APPOINTMENT (OUTPATIENT)
Dept: URBAN - METROPOLITAN AREA CLINIC 139 | Facility: CLINIC | Age: 60
Setting detail: DERMATOLOGY
End: 2025-04-23

## 2025-04-23 DIAGNOSIS — D22 MELANOCYTIC NEVI: ICD-10-CM

## 2025-04-23 DIAGNOSIS — L82.1 OTHER SEBORRHEIC KERATOSIS: ICD-10-CM

## 2025-04-23 DIAGNOSIS — L57.0 ACTINIC KERATOSIS: ICD-10-CM

## 2025-04-23 PROBLEM — D22.5 MELANOCYTIC NEVI OF TRUNK: Status: ACTIVE | Noted: 2025-04-23

## 2025-04-23 PROBLEM — D48.5 NEOPLASM OF UNCERTAIN BEHAVIOR OF SKIN: Status: ACTIVE | Noted: 2025-04-23

## 2025-04-23 PROCEDURE — ? ADDITIONAL NOTES

## 2025-04-23 PROCEDURE — ? LIQUID NITROGEN

## 2025-04-23 PROCEDURE — 17000 DESTRUCT PREMALG LESION: CPT | Mod: 59

## 2025-04-23 PROCEDURE — 99213 OFFICE O/P EST LOW 20 MIN: CPT | Mod: 25

## 2025-04-23 PROCEDURE — 11102 TANGNTL BX SKIN SINGLE LES: CPT

## 2025-04-23 PROCEDURE — ? COUNSELING

## 2025-04-23 PROCEDURE — ? BIOPSY BY SHAVE METHOD

## 2025-04-23 ASSESSMENT — LOCATION ZONE DERM
LOCATION ZONE: TRUNK
LOCATION ZONE: LEG
LOCATION ZONE: ARM

## 2025-04-23 ASSESSMENT — LOCATION SIMPLE DESCRIPTION DERM
LOCATION SIMPLE: RIGHT UPPER BACK
LOCATION SIMPLE: LEFT FOREARM
LOCATION SIMPLE: LEFT UPPER BACK
LOCATION SIMPLE: LEFT BREAST
LOCATION SIMPLE: RIGHT CALF

## 2025-04-23 ASSESSMENT — LOCATION DETAILED DESCRIPTION DERM
LOCATION DETAILED: LEFT LATERAL BREAST 4-5:00 REGION
LOCATION DETAILED: RIGHT PROXIMAL CALF
LOCATION DETAILED: LEFT LATERAL UPPER BACK
LOCATION DETAILED: LEFT INFERIOR UPPER BACK
LOCATION DETAILED: RIGHT SUPERIOR UPPER BACK
LOCATION DETAILED: LEFT PROXIMAL RADIAL DORSAL FOREARM

## 2025-04-23 NOTE — PROCEDURE: BIOPSY BY SHAVE METHOD
Detail Level: Detailed
Depth Of Biopsy: dermis
Was A Bandage Applied: No
Size Of Lesion In Cm: 0.5
X Size Of Lesion In Cm: 0
Biopsy Type: H and E
Biopsy Method: Personna blade
Anesthesia Type: 2% lidocaine with epinephrine
Anesthesia Volume In Cc: 1
Hemostasis: Drysol
Wound Care: Vaseline
Dressing: Band-Aid
Type Of Destruction Used: Curettage
Cryotherapy Text: The wound bed was treated with cryotherapy after the biopsy was performed.
Electrodesiccation Text: The wound bed was treated with electrodesiccation after the biopsy was performed.
Electrodesiccation And Curettage Text: The wound bed was treated with electrodesiccation and curettage after the biopsy was performed.
Silver Nitrate Text: The wound bed was treated with silver nitrate after the biopsy was performed.
Lab: [id number]
Lab Facility: [id number]
Path Notes (To The Dermatopathologist): Please check margins
Medical Necessity Information: It is in your best interest to select a reason for this procedure from the list below. All of these items fulfill various CMS LCD requirements except the new and changing color options.
Consent: Verbal consent was obtained and risks were reviewed including but not limited to scarring, infection, bleeding, scabbing, incomplete removal, nerve damage and allergy to anesthesia.
Render Post-Care Instructions In Note?: yes
Post-Care Instructions: I reviewed with the patient in detail post-care instructions. Patient is to keep the biopsy site dry overnight, and then apply bacitracin twice daily until healed. Patient may apply hydrogen peroxide soaks to remove any crusting.
Notification Instructions: Patient will be notified of biopsy results. However, patient instructed to call the office if not contacted within 2 weeks.
Billing Type: Third-Party Bill
Information: Selecting Yes will display possible errors in your note based on the variables you have selected. This validation is only offered as a suggestion for you. PLEASE NOTE THAT THE VALIDATION TEXT WILL BE REMOVED WHEN YOU FINALIZE YOUR NOTE. IF YOU WANT TO FAX A PRELIMINARY NOTE YOU WILL NEED TO TOGGLE THIS TO 'NO' IF YOU DO NOT WANT IT IN YOUR FAXED NOTE.

## 2025-04-23 NOTE — PROCEDURE: ADDITIONAL NOTES
Detail Level: Simple
Render Risk Assessment In Note?: no
Additional Notes: Benign lesions treated with liquid nitrogen. Per Dr. Zavala no charge for treatment. Counseled pt on possibility of scarring and pigment changes

## 2025-04-23 NOTE — PROCEDURE: LIQUID NITROGEN
Detail Level: Zone
Render Post-Care Instructions In Note?: no
Number Of Freeze-Thaw Cycles: 1 freeze-thaw cycle
Post-Care Instructions: I reviewed with the patient in detail post-care instructions. Patient is to wear sunprotection, and avoid picking at any of the treated lesions. Pt may apply Vaseline to crusted or scabbing areas.
Duration Of Freeze Thaw-Cycle (Seconds): 5
Application Tool (Optional): Cry-AC
Show Applicator Variable?: Yes
Consent: The patient's consent was obtained including but not limited to risks of crusting, scabbing, blistering, scarring, darker or lighter pigmentary change, recurrence, incomplete removal and infection.